# Patient Record
Sex: MALE | Race: WHITE | URBAN - METROPOLITAN AREA
[De-identification: names, ages, dates, MRNs, and addresses within clinical notes are randomized per-mention and may not be internally consistent; named-entity substitution may affect disease eponyms.]

---

## 2020-03-16 ENCOUNTER — OFFICE VISIT (OUTPATIENT)
Dept: FAMILY MEDICINE CLINIC | Age: 20
End: 2020-03-16
Payer: COMMERCIAL

## 2020-03-16 VITALS
SYSTOLIC BLOOD PRESSURE: 106 MMHG | HEIGHT: 71 IN | HEART RATE: 70 BPM | BODY MASS INDEX: 31.36 KG/M2 | OXYGEN SATURATION: 98 % | WEIGHT: 224 LBS | DIASTOLIC BLOOD PRESSURE: 82 MMHG

## 2020-03-16 PROBLEM — F41.9 ANXIETY AND DEPRESSION: Status: ACTIVE | Noted: 2020-03-16

## 2020-03-16 PROBLEM — F32.A ANXIETY AND DEPRESSION: Status: ACTIVE | Noted: 2020-03-16

## 2020-03-16 PROCEDURE — G8431 POS CLIN DEPRES SCRN F/U DOC: HCPCS | Performed by: NURSE PRACTITIONER

## 2020-03-16 PROCEDURE — 99203 OFFICE O/P NEW LOW 30 MIN: CPT | Performed by: NURSE PRACTITIONER

## 2020-03-16 PROCEDURE — G0444 DEPRESSION SCREEN ANNUAL: HCPCS | Performed by: NURSE PRACTITIONER

## 2020-03-16 RX ORDER — SERTRALINE HYDROCHLORIDE 25 MG/1
25 TABLET, FILM COATED ORAL DAILY
Qty: 90 TABLET | Refills: 0 | Status: SHIPPED | OUTPATIENT
Start: 2020-03-16 | End: 2020-04-27

## 2020-03-16 RX ORDER — SERTRALINE HYDROCHLORIDE 25 MG/1
25 TABLET, FILM COATED ORAL DAILY
Qty: 30 TABLET | Refills: 0 | Status: SHIPPED | OUTPATIENT
Start: 2020-03-16 | End: 2020-04-27 | Stop reason: SDUPTHER

## 2020-03-16 ASSESSMENT — ENCOUNTER SYMPTOMS
NAUSEA: 0
DIARRHEA: 0
SHORTNESS OF BREATH: 0
VOMITING: 0
COUGH: 0

## 2020-03-16 ASSESSMENT — PATIENT HEALTH QUESTIONNAIRE - PHQ9
8. MOVING OR SPEAKING SO SLOWLY THAT OTHER PEOPLE COULD HAVE NOTICED. OR THE OPPOSITE, BEING SO FIGETY OR RESTLESS THAT YOU HAVE BEEN MOVING AROUND A LOT MORE THAN USUAL: 2
4. FEELING TIRED OR HAVING LITTLE ENERGY: 2
SUM OF ALL RESPONSES TO PHQ9 QUESTIONS 1 & 2: 5
9. THOUGHTS THAT YOU WOULD BE BETTER OFF DEAD, OR OF HURTING YOURSELF: 0
SUM OF ALL RESPONSES TO PHQ QUESTIONS 1-9: 18
3. TROUBLE FALLING OR STAYING ASLEEP: 2
1. LITTLE INTEREST OR PLEASURE IN DOING THINGS: 2
2. FEELING DOWN, DEPRESSED OR HOPELESS: 3
6. FEELING BAD ABOUT YOURSELF - OR THAT YOU ARE A FAILURE OR HAVE LET YOURSELF OR YOUR FAMILY DOWN: 3
5. POOR APPETITE OR OVEREATING: 2
SUM OF ALL RESPONSES TO PHQ QUESTIONS 1-9: 18
10. IF YOU CHECKED OFF ANY PROBLEMS, HOW DIFFICULT HAVE THESE PROBLEMS MADE IT FOR YOU TO DO YOUR WORK, TAKE CARE OF THINGS AT HOME, OR GET ALONG WITH OTHER PEOPLE: 3
7. TROUBLE CONCENTRATING ON THINGS, SUCH AS READING THE NEWSPAPER OR WATCHING TELEVISION: 2

## 2020-03-16 NOTE — PROGRESS NOTES
3/16/2020     Chief Complaint   Patient presents with   [facility] Doctor     new patient      Bill Denton (:  2000) is a 23 y.o. male, here for new patient visit to establish care. HPI  Mood Disorder:  Presents with complaints of depression and anxiety. 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 He reports feeling of sadness, hopelessness, fatigue, lack of motivation, insomnia, panic attacks. Theses symptoms have been on going \"my entire life. \" states that stressful things such as school trigger his anxiety. He has panic attacks which consist of sweating, shaking, nausea. States he does have a good support system at school and back at home. He has not started therapy but would like to. States he has had fleeting thoughts of suicide in the past but denies any current plans or thoughts. Denies recreational drug or alcohol abuse. He is a non-smoker. Has never been treated for this in the past.     Review of Systems   Constitutional: Negative for fatigue and fever. Respiratory: Negative for cough and shortness of breath. Cardiovascular: Negative for chest pain and leg swelling. Gastrointestinal: Negative for diarrhea, nausea and vomiting. Neurological: Negative for dizziness and headaches. Psychiatric/Behavioral: Positive for decreased concentration, dysphoric mood and sleep disturbance. Negative for agitation, behavioral problems, confusion, hallucinations, self-injury and suicidal ideas. The patient is nervous/anxious. The patient is not hyperactive. Prior to Visit Medications    Medication Sig Taking?  Authorizing Provider   sertraline (ZOLOFT) 25 MG tablet Take 1 tablet by mouth daily Yes MIGEL Carrizales CNP   sertraline (ZOLOFT) 25 MG tablet Take 1 tablet by mouth daily Yes MIGEL Carrizales CNP        Social History     Tobacco Use    Smoking status: Never Smoker    Smokeless tobacco: Never Used   Substance Use screening (PHQ-9 Total Score: 18), the following plan was implemented: medication prescribed: Zoloft- 25 mg- patient will call for any significant medication side effects or worsening symptoms of depression. Patient will follow-up in 4 day(s) with PCP.

## 2020-03-16 NOTE — PATIENT INSTRUCTIONS
doctor may occasionally change your dose. Do not take this medicine in larger or smaller amounts or for longer than recommended. Sertraline may be taken with or without food. Try to take the medicine at the same time each day. The liquid (oral concentrate) form of sertraline must be diluted before you take it. To be sure you get the correct dose, measure the liquid with the medicine dropper provided. Mix the dose with 4 ounces (one-half cup) of water, ginger ale, lemon/lime soda, lemonade, or orange juice. Do not use any other liquids to dilute the medicine. Stir this mixture and drink all of it right away. To make sure you get the entire dose, add a little more water to the same glass, swirl gently and drink right away. This medicine can cause you to have a false positive drug screening test. If you provide a urine sample for drug screening, tell the laboratory staff that you are taking sertraline. It may take up to 4 weeks before your symptoms improve. Keep using the medication as directed and tell your doctor if your symptoms do not improve. Do not stop using sertraline suddenly, or you could have unpleasant withdrawal symptoms. Ask your doctor how to safely stop using sertraline. Store at room temperature away from moisture and heat. What happens if I miss a dose? Take the missed dose as soon as you remember. Skip the missed dose if it is almost time for your next scheduled dose. Do not take extra medicine to make up the missed dose. What happens if I overdose? Seek emergency medical attention or call the Poison Help line at 1-498.274.3179. What should I avoid while taking sertraline? Do not drink alcohol. Ask your doctor before taking a nonsteroidal anti-inflammatory drug (NSAID) for pain, arthritis, fever, or swelling. This includes aspirin, ibuprofen (Advil, Motrin), naproxen (Aleve), celecoxib (Celebrex), diclofenac, indomethacin, meloxicam, and others.  Using an NSAID with sertraline may cause sleeping pill, narcotic medication, muscle relaxer, or medicine for anxiety, depression, or seizures. Other drugs may interact with sertraline, including prescription and over-the-counter medicines, vitamins, and herbal products. Tell your doctor about all your current medicines and any medicine you start or stop using. Where can I get more information? Your pharmacist can provide more information about sertraline. Remember, keep this and all other medicines out of the reach of children, never share your medicines with others, and use this medication only for the indication prescribed. Every effort has been made to ensure that the information provided by [address] [place], [place] [address] is accurate, up-to-date, and complete, but no guarantee is made to that effect. Drug information contained herein may be time sensitive. [facility] information has been compiled for use by healthcare practitioners and consumers in the [place] and therefore [facility] does not warrant that uses outside of the [place] are appropriate, unless specifically indicated otherwise. [facility]'s drug information does not endorse drugs, diagnose patients or recommend therapy. [facility] drug information is an informational resource designed to assist licensed healthcare practitioners in caring for their patients and/or to serve consumers viewing this service as a supplement to, and not a substitute for, the expertise, skill, knowledge and judgment of healthcare practitioners. The absence of a warning for a given drug or drug combination in no way should be construed to indicate that the drug or drug combination is safe, effective or appropriate for any given patient. [facility] does not assume any responsibility for any aspect of healthcare administered with the aid of information [facility] provides.  The information contained herein is not intended to cover all possible uses, directions, precautions, warnings, drug interactions, allergic reactions, or adverse effects. If you have questions about the drugs you are taking, check with your doctor, nurse or pharmacist.  Copyright 2133-8325 [address]. Version: 21.01. Revision date: 8/9/2017. Care instructions adapted under license by [facility] ([facility]). If you have questions about a medical condition or this instruction, always ask your healthcare professional. [place] [address] any warranty or liability for your use of this information.

## 2020-03-31 ENCOUNTER — TELEPHONE (OUTPATIENT)
Dept: FAMILY MEDICINE CLINIC | Age: 20
End: 2020-03-31

## 2020-04-27 ENCOUNTER — VIRTUAL VISIT (OUTPATIENT)
Dept: FAMILY MEDICINE CLINIC | Age: 20
End: 2020-04-27
Payer: COMMERCIAL

## 2020-04-27 PROCEDURE — 99213 OFFICE O/P EST LOW 20 MIN: CPT | Performed by: NURSE PRACTITIONER

## 2020-04-27 ASSESSMENT — ENCOUNTER SYMPTOMS
COUGH: 0
NAUSEA: 0
SHORTNESS OF BREATH: 0
DIARRHEA: 0
VOMITING: 0

## 2020-04-27 NOTE — PROGRESS NOTES
2020    Chief Complaint   Patient presents with    Anxiety    Depression    1 Month Follow-Up     TELEHEALTH EVALUATION -- Audio/Visual (During YRBFC-41 public health emergency)    HPI:    Leslie Morales (:  2000) has requested an audio/video evaluation for the following concern(s):    Following up on anxiety and depression, last month he was started on sertraline 25 mg. Has noticed that there has been some improvement in anxiety, feels like \"the edge is taken off\" since starting the sertraline. He has not noticed much improvement in his depression or fatigue. He thinks that his energy level is still pretty low but there are many external factors that are attributing to that. He continues to use his school therapy program virtually and has noticed benefit with that. He denies medication side effects and denies SI HI. Previous HPI 2020  Mood Disorder:  Presents with complaints of depression and anxiety. 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 He reports feeling of sadness, hopelessness, fatigue, lack of motivation, insomnia, panic attacks. Theses symptoms have been on going \"my entire life. \" states that stressful things such as school trigger his anxiety. He has panic attacks which consist of sweating, shaking, nausea. States he does have a good support system at school and back at home. He has not started therapy but would like to. States he has had fleeting thoughts of suicide in the past but denies any current plans or thoughts. Denies recreational drug or alcohol abuse. He is a non-smoker. Has never been treated for this in the past.        Review of Systems   Constitutional: Negative for fatigue and fever. Respiratory: Negative for cough and shortness of breath. Cardiovascular: Negative for chest pain and leg swelling. Gastrointestinal: Negative for diarrhea, nausea and vomiting. Neurological: Negative for dizziness and headaches. (limited exam to video visit)          [] No gaze palsy        [] Abnormal-         Skin:        [] No significant exanthematous lesions or discoloration noted on facial skin         [] Abnormal-            Psychiatric:       [x] Normal Affect, mood, thought content [] No Hallucinations        [] Abnormal-     Other pertinent observable physical exam findings-     ASSESSMENT/PLAN:  1. Anxiety and depression- symptoms improving but still very apparent. He is tolerating sertraline without side effects. Will increase sertraline 50 mg daily. encouraged to continue therapy. Follow up in 6-8 weeks or sooner if needed. - sertraline (ZOLOFT) 50 MG tablet; Take 1 tablet by mouth daily  Dispense: 90 tablet; Refill: 0    Return in about 6 weeks (around 6/8/2020) for Anxiety, Depression. Leslie Morales is a 23 y.o. male being evaluated by a Virtual Visit (video visit) encounter to address concerns as mentioned above. A caregiver was present when appropriate. Due to this being a TeleHealth encounter ([facility]), evaluation of the following organ systems was limited: Vitals/Constitutional/EENT/Resp/CV/GI//MS/Neuro/Skin/Heme-Lymph-Imm. Pursuant to the emergency declaration under the [address] [place] [address] authority and the [employer] Act, this Virtual Visit was conducted with patient's (and/or legal guardian's) consent, to reduce the patient's risk of exposure to COVID-19 and provide necessary medical care. The patient (and/or legal guardian) has also been advised to contact this office for worsening conditions or problems, and seek emergency medical treatment and/or call 911 if deemed necessary.      Patient identification was verified at the start of the visit: Yes    Total time spent on this encounter: 15 minutes    Services were provided through a video synchronous discussion virtually to substitute for in-person clinic visit. Patient and provider were located at their individual homes. --MIGEL Cowan CNP on 4/27/2020 at 9:38 AM    An electronic signature was used to authenticate this note.

## 2021-04-10 DIAGNOSIS — F41.9 ANXIETY AND DEPRESSION: ICD-10-CM

## 2021-04-10 DIAGNOSIS — F32.A ANXIETY AND DEPRESSION: ICD-10-CM

## 2022-01-25 DIAGNOSIS — F32.A ANXIETY AND DEPRESSION: ICD-10-CM

## 2022-01-25 DIAGNOSIS — F41.9 ANXIETY AND DEPRESSION: ICD-10-CM

## 2022-01-25 NOTE — TELEPHONE ENCOUNTER
Patient is away at school in [place]. Can you fill his Zoloft one more time? 90 day to [facility]. She will have him scld a virtual for next Tuesday 4/27/2020  No future appointments.